# Patient Record
Sex: MALE | Race: WHITE | NOT HISPANIC OR LATINO | Employment: OTHER | URBAN - METROPOLITAN AREA
[De-identification: names, ages, dates, MRNs, and addresses within clinical notes are randomized per-mention and may not be internally consistent; named-entity substitution may affect disease eponyms.]

---

## 2023-04-24 ENCOUNTER — APPOINTMENT (OUTPATIENT)
Dept: PRIMARY CARE | Facility: CLINIC | Age: 68
End: 2023-04-24
Payer: MEDICARE

## 2023-06-05 ENCOUNTER — LAB (OUTPATIENT)
Dept: LAB | Facility: LAB | Age: 68
End: 2023-06-05
Payer: MEDICARE

## 2023-06-05 ENCOUNTER — OFFICE VISIT (OUTPATIENT)
Dept: PRIMARY CARE | Facility: CLINIC | Age: 68
End: 2023-06-05
Payer: MEDICARE

## 2023-06-05 VITALS
DIASTOLIC BLOOD PRESSURE: 69 MMHG | HEART RATE: 58 BPM | OXYGEN SATURATION: 95 % | TEMPERATURE: 99.2 F | WEIGHT: 272.1 LBS | BODY MASS INDEX: 40.3 KG/M2 | SYSTOLIC BLOOD PRESSURE: 131 MMHG | HEIGHT: 69 IN

## 2023-06-05 DIAGNOSIS — E66.01 CLASS 2 SEVERE OBESITY DUE TO EXCESS CALORIES WITH SERIOUS COMORBIDITY IN ADULT, UNSPECIFIED BMI (MULTI): ICD-10-CM

## 2023-06-05 DIAGNOSIS — I10 ESSENTIAL HYPERTENSION: ICD-10-CM

## 2023-06-05 DIAGNOSIS — F52.21 ERECTILE DYSFUNCTION OF NON-ORGANIC ORIGIN: Primary | ICD-10-CM

## 2023-06-05 PROBLEM — H53.9 VISUAL CHANGES: Status: ACTIVE | Noted: 2023-06-05

## 2023-06-05 PROBLEM — M47.816 LUMBAR SPONDYLOSIS: Status: ACTIVE | Noted: 2023-06-05

## 2023-06-05 PROBLEM — H93.11 SUBJECTIVE TINNITUS OF RIGHT EAR: Status: ACTIVE | Noted: 2023-06-05

## 2023-06-05 PROBLEM — M75.101 ROTATOR CUFF SYNDROME OF RIGHT SHOULDER: Status: ACTIVE | Noted: 2023-06-05

## 2023-06-05 PROBLEM — J30.9 ALLERGIC RHINITIS: Status: ACTIVE | Noted: 2023-06-05

## 2023-06-05 PROBLEM — H02.59 FLOPPY EYELID SYNDROME OF BOTH EYES: Status: ACTIVE | Noted: 2023-06-05

## 2023-06-05 PROBLEM — E78.00 ELEVATED LOW DENSITY LIPOPROTEIN (LDL) CHOLESTEROL LEVEL: Status: ACTIVE | Noted: 2023-06-05

## 2023-06-05 PROBLEM — H90.3 BILATERAL SENSORINEURAL HEARING LOSS: Status: ACTIVE | Noted: 2023-06-05

## 2023-06-05 PROBLEM — H90.A31 MIXED CONDUCTIVE AND SENSORINEURAL HEARING LOSS OF RIGHT EAR WITH RESTRICTED HEARING OF LEFT EAR: Status: ACTIVE | Noted: 2023-06-05

## 2023-06-05 PROBLEM — H69.91 EUSTACHIAN TUBE DYSFUNCTION, RIGHT: Status: ACTIVE | Noted: 2023-06-05

## 2023-06-05 PROBLEM — R97.20 ABNORMAL PROSTATE SPECIFIC ANTIGEN: Status: ACTIVE | Noted: 2023-06-05

## 2023-06-05 PROBLEM — H90.5 SENSORINEURAL HEARING LOSS (SNHL) OF LEFT EAR: Status: ACTIVE | Noted: 2023-06-05

## 2023-06-05 PROBLEM — K27.9 PEPTIC ULCER: Status: ACTIVE | Noted: 2021-05-27

## 2023-06-05 PROBLEM — M10.9 GOUT: Status: ACTIVE | Noted: 2023-06-05

## 2023-06-05 PROBLEM — M75.41 IMPINGEMENT SYNDROME OF RIGHT SHOULDER: Status: ACTIVE | Noted: 2023-06-05

## 2023-06-05 PROBLEM — H61.23 EXCESSIVE CERUMEN IN BOTH EAR CANALS: Status: ACTIVE | Noted: 2023-06-05

## 2023-06-05 PROBLEM — Z96.1 PSEUDOPHAKIA OF BOTH EYES: Status: ACTIVE | Noted: 2023-06-05

## 2023-06-05 PROBLEM — R20.2 LEFT HAND PARESTHESIA: Status: ACTIVE | Noted: 2023-06-05

## 2023-06-05 PROBLEM — M25.511 RIGHT SHOULDER PAIN: Status: ACTIVE | Noted: 2023-06-05

## 2023-06-05 PROBLEM — H26.491 POSTERIOR CAPSULAR OPACIFICATION VISUALLY SIGNIFICANT OF RIGHT EYE: Status: ACTIVE | Noted: 2023-06-05

## 2023-06-05 PROBLEM — K56.609 SMALL BOWEL OBSTRUCTION (MULTI): Status: ACTIVE | Noted: 2021-05-28

## 2023-06-05 PROBLEM — H16.223 KERATOCONJUNCTIVITIS SICCA OF BOTH EYES NOT DUE TO SJOGREN'S SYNDROME: Status: ACTIVE | Noted: 2023-06-05

## 2023-06-05 LAB
ALANINE AMINOTRANSFERASE (SGPT) (U/L) IN SER/PLAS: 17 U/L (ref 10–52)
ALBUMIN (G/DL) IN SER/PLAS: 4.4 G/DL (ref 3.4–5)
ALKALINE PHOSPHATASE (U/L) IN SER/PLAS: 53 U/L (ref 33–136)
ANION GAP IN SER/PLAS: 8 MMOL/L (ref 10–20)
ASPARTATE AMINOTRANSFERASE (SGOT) (U/L) IN SER/PLAS: 16 U/L (ref 9–39)
BILIRUBIN TOTAL (MG/DL) IN SER/PLAS: 0.7 MG/DL (ref 0–1.2)
CALCIUM (MG/DL) IN SER/PLAS: 10.2 MG/DL (ref 8.6–10.6)
CARBON DIOXIDE, TOTAL (MMOL/L) IN SER/PLAS: 32 MMOL/L (ref 21–32)
CHLORIDE (MMOL/L) IN SER/PLAS: 102 MMOL/L (ref 98–107)
CHOLESTEROL (MG/DL) IN SER/PLAS: 155 MG/DL (ref 0–199)
CHOLESTEROL IN HDL (MG/DL) IN SER/PLAS: 62.9 MG/DL
CHOLESTEROL/HDL RATIO: 2.5
CREATININE (MG/DL) IN SER/PLAS: 0.88 MG/DL (ref 0.5–1.3)
GFR MALE: >90 ML/MIN/1.73M2
GLUCOSE (MG/DL) IN SER/PLAS: 99 MG/DL (ref 74–99)
LDL: 80 MG/DL (ref 0–99)
POTASSIUM (MMOL/L) IN SER/PLAS: 4.3 MMOL/L (ref 3.5–5.3)
PROTEIN TOTAL: 7.4 G/DL (ref 6.4–8.2)
SODIUM (MMOL/L) IN SER/PLAS: 138 MMOL/L (ref 136–145)
TRIGLYCERIDE (MG/DL) IN SER/PLAS: 60 MG/DL (ref 0–149)
UREA NITROGEN (MG/DL) IN SER/PLAS: 16 MG/DL (ref 6–23)
VLDL: 12 MG/DL (ref 0–40)

## 2023-06-05 PROCEDURE — 80061 LIPID PANEL: CPT

## 2023-06-05 PROCEDURE — 99203 OFFICE O/P NEW LOW 30 MIN: CPT | Performed by: FAMILY MEDICINE

## 2023-06-05 PROCEDURE — 80053 COMPREHEN METABOLIC PANEL: CPT

## 2023-06-05 PROCEDURE — 3075F SYST BP GE 130 - 139MM HG: CPT | Performed by: FAMILY MEDICINE

## 2023-06-05 PROCEDURE — 3078F DIAST BP <80 MM HG: CPT | Performed by: FAMILY MEDICINE

## 2023-06-05 PROCEDURE — 1036F TOBACCO NON-USER: CPT | Performed by: FAMILY MEDICINE

## 2023-06-05 PROCEDURE — 1159F MED LIST DOCD IN RCRD: CPT | Performed by: FAMILY MEDICINE

## 2023-06-05 PROCEDURE — 36415 COLL VENOUS BLD VENIPUNCTURE: CPT

## 2023-06-05 RX ORDER — TADALAFIL 5 MG/1
TABLET ORAL
COMMUNITY
Start: 2018-07-09 | End: 2023-06-05 | Stop reason: SDUPTHER

## 2023-06-05 RX ORDER — AMLODIPINE BESYLATE 10 MG/1
TABLET ORAL
COMMUNITY
Start: 2014-01-28 | End: 2023-10-12 | Stop reason: SDUPTHER

## 2023-06-05 RX ORDER — KETOCONAZOLE 20 MG/G
CREAM TOPICAL
COMMUNITY
Start: 2023-04-17

## 2023-06-05 RX ORDER — METOPROLOL SUCCINATE 100 MG/1
TABLET, EXTENDED RELEASE ORAL
COMMUNITY
Start: 2014-01-28 | End: 2023-10-12 | Stop reason: SDUPTHER

## 2023-06-05 RX ORDER — SENNOSIDES 8.6 MG/1
1 TABLET ORAL DAILY PRN
COMMUNITY
Start: 2021-05-30

## 2023-06-05 RX ORDER — ATORVASTATIN CALCIUM 40 MG/1
TABLET, FILM COATED ORAL
COMMUNITY
Start: 2014-01-28 | End: 2023-10-12 | Stop reason: SDUPTHER

## 2023-06-05 RX ORDER — ALLOPURINOL 300 MG/1
TABLET ORAL
COMMUNITY
Start: 2014-01-28 | End: 2023-10-12 | Stop reason: SDUPTHER

## 2023-06-05 RX ORDER — TADALAFIL 5 MG/1
5 TABLET ORAL DAILY PRN
Qty: 10 TABLET | Refills: 2 | Status: SHIPPED | OUTPATIENT
Start: 2023-06-05

## 2023-06-05 RX ORDER — MINOXIDIL 10 MG/1
TABLET ORAL
COMMUNITY
Start: 2014-01-28 | End: 2023-10-12 | Stop reason: SDUPTHER

## 2023-06-05 RX ORDER — TRIAMTERENE AND HYDROCHLOROTHIAZIDE 75; 50 MG/1; MG/1
TABLET ORAL
COMMUNITY
Start: 2014-01-28 | End: 2023-10-12 | Stop reason: SDUPTHER

## 2023-06-05 NOTE — PROGRESS NOTES
"  Subjective   Chief complaint: Jose Lawrence is a 67 y.o. male who presents for Establish Care.    HPI:  67 yrs old here to establish care he does have HTN for which he is on multiple medications, he states that his BP is usually higher.He is currently asymptomatic. He has gained a lot and have decreased exercise tolerance that have worsened by the epidemic.   Used to be a  and currently retired, lives alone, no kids.  He is not currently sexual active but has been using cialis for ED , have been using it for 5 years.      Ros:  Patient denies  Shortness of breath , chest pain  Nausea vomiting and diarrhea  No fever or chills  No dysuria  No hearing or vision changes  No skin lesions.    Objective   /69   Pulse 58   Temp 37.3 °C (99.2 °F) (Temporal)   Ht 1.753 m (5' 9\")   Wt 123 kg (272 lb 1.6 oz)   SpO2 95%   BMI 40.18 kg/m²       General appearance: alert and oriented, in no acute distress  Eyes: conjunctivae/corneas clear. PERRL, EOM's intact. Fundi benign.  Neck: no adenopathy, no carotid bruit, supple, symmetrical, trachea midline, and thyroid not enlarged, symmetric, no tenderness/mass/nodules  Lungs: clear to auscultation bilaterally  Chest wall: no tenderness  Heart: regular rate and rhythm, S1, S2 normal, no murmur, click, rub or gallop  Abdomen: soft, non-tender; bowel sounds normal; no masses, no organomegaly  Skin: Skin color, texture, turgor normal. No rashes or lesions  Neurologic: Motor and sensory Grossly normal        I have reviewed and reconciled the medication list with the patient today.   Current Outpatient Medications:     allopurinol (Zyloprim) 300 mg tablet, allopurinol 300 mg tablet, Disp: , Rfl:     amLODIPine (Norvasc) 10 mg tablet, amlodipine 10 mg tablet, Disp: , Rfl:     atorvastatin (Lipitor) 40 mg tablet, atorvastatin 40 mg tablet, Disp: , Rfl:     ketoconazole (NIZOral) 2 % cream, APPLY 1 APPLICATION TWICE A DAY TO AFFECTED AREA OF FACE, Disp: , Rfl:     " metoprolol succinate XL (Toprol-XL) 100 mg 24 hr tablet, metoprolol succinate  mg tablet,extended release 24 hr, Disp: , Rfl:     minoxidil (Loniten) 10 mg tablet, minoxidil 10 mg tablet, Disp: , Rfl:     sennosides (Senokot) 8.6 mg tablet, Take 1 tablet (8.6 mg) by mouth once daily as needed., Disp: , Rfl:     tadalafil (Cialis) 5 mg tablet, Take by mouth., Disp: , Rfl:     triamterene-hydrochlorothiazid (Maxzide) 75-50 mg tablet, triamterene 75 mg-hydrochlorothiazide 50 mg tablet, Disp: , Rfl:      Imaging:  No results found.     Labs reviewed:    Lab Results   Component Value Date    WBC 5.6 07/26/2022    HGB 15.1 07/26/2022    HCT 44.3 07/26/2022     07/26/2022    CHOL 150 08/11/2022    TRIG 103 08/11/2022    HDL 57.8 08/11/2022    ALT 20 08/11/2022    AST 15 08/11/2022     08/11/2022    K 4.0 08/11/2022     08/11/2022    CREATININE 0.70 08/11/2022    BUN 17 08/11/2022    CO2 31 08/11/2022    PSA 1.18 08/11/2022         Assessment/Plan   Diagnoses and all orders for this visit:  67 yrs old here to establish care and for meds refills,  Erectile dysfunction of non-organic origin  -     tadalafil (Cialis) 5 mg tablet; Take 1 tablet (5 mg) by mouth once daily as needed for erectile dysfunction.  Essential hypertension  -     Comprehensive Metabolic Panel; Future  -     Lipid Panel; Future  Class 2 severe obesity due to excess calories with serious comorbidity in adult, unspecified BMI (CMS/HCC)  -     Lipid Panel; Future    Diagnosis and Management discussed with the patient.  Patient agreeable with plan.  Patient advised to Return to clinic with new or unresolved symptoms.  Ekaterina Said MD    This note was partially generated using the Dragon Voice Recognition System and there may be some incorrect words or wording, spelling and /or spelling errors, or punctuation errors that were not corrected prior to committing the note to the medical record.

## 2023-09-20 ENCOUNTER — APPOINTMENT (OUTPATIENT)
Dept: PRIMARY CARE | Facility: CLINIC | Age: 68
End: 2023-09-20
Payer: MEDICARE

## 2023-10-04 ENCOUNTER — APPOINTMENT (OUTPATIENT)
Dept: PRIMARY CARE | Facility: CLINIC | Age: 68
End: 2023-10-04
Payer: MEDICARE

## 2023-10-11 ENCOUNTER — APPOINTMENT (OUTPATIENT)
Dept: PRIMARY CARE | Facility: CLINIC | Age: 68
End: 2023-10-11
Payer: MEDICARE

## 2023-10-12 DIAGNOSIS — I10 ESSENTIAL HYPERTENSION: ICD-10-CM

## 2023-10-12 DIAGNOSIS — M10.00 IDIOPATHIC GOUT, UNSPECIFIED CHRONICITY, UNSPECIFIED SITE: Primary | ICD-10-CM

## 2023-10-12 NOTE — TELEPHONE ENCOUNTER
Pt called requesting refills of atorvastatin, allopurinol, amlodipine, minoxidil, metoprolol and triamterene hydrochlorothiazide. Orders pended and routed to provider.   
No

## 2023-10-13 ENCOUNTER — DOCUMENTATION (OUTPATIENT)
Dept: PRIMARY CARE | Facility: CLINIC | Age: 68
End: 2023-10-13
Payer: MEDICARE

## 2023-10-13 RX ORDER — AMLODIPINE BESYLATE 10 MG/1
10 TABLET ORAL DAILY
Qty: 30 TABLET | Refills: 1 | Status: SHIPPED | OUTPATIENT
Start: 2023-10-13 | End: 2023-12-04

## 2023-10-13 RX ORDER — METOPROLOL SUCCINATE 100 MG/1
100 TABLET, EXTENDED RELEASE ORAL DAILY
Qty: 30 TABLET | Refills: 2 | Status: SHIPPED | OUTPATIENT
Start: 2023-10-13

## 2023-10-13 RX ORDER — ATORVASTATIN CALCIUM 40 MG/1
40 TABLET, FILM COATED ORAL DAILY
Qty: 30 TABLET | Refills: 2 | Status: SHIPPED | OUTPATIENT
Start: 2023-10-13

## 2023-10-13 RX ORDER — ALLOPURINOL 300 MG/1
300 TABLET ORAL DAILY
Qty: 30 TABLET | Refills: 2 | Status: SHIPPED | OUTPATIENT
Start: 2023-10-13

## 2023-10-13 RX ORDER — MINOXIDIL 10 MG/1
10 TABLET ORAL DAILY
Qty: 30 TABLET | Refills: 1 | Status: SHIPPED | OUTPATIENT
Start: 2023-10-13 | End: 2023-12-04

## 2023-10-13 RX ORDER — TRIAMTERENE AND HYDROCHLOROTHIAZIDE 75; 50 MG/1; MG/1
1 TABLET ORAL DAILY
Qty: 30 TABLET | Refills: 2 | Status: SHIPPED | OUTPATIENT
Start: 2023-10-13

## 2023-10-13 NOTE — PROGRESS NOTES
Call placed to pt r/t refills and med changes. Provider changed dosing for minoxidil and amlodipine to once daily with suggestion to schedule follow up visit. Call placed. Pt updated to changes, but refused scheduling at this time d/t needing his calendar to schedule. Provider made away.

## 2023-12-02 DIAGNOSIS — I10 ESSENTIAL HYPERTENSION: ICD-10-CM

## 2023-12-04 RX ORDER — AMLODIPINE BESYLATE 10 MG/1
10 TABLET ORAL DAILY
Qty: 90 TABLET | Refills: 1 | Status: SHIPPED | OUTPATIENT
Start: 2023-12-04

## 2023-12-04 RX ORDER — MINOXIDIL 10 MG/1
10 TABLET ORAL DAILY
Qty: 90 TABLET | Refills: 1 | Status: SHIPPED | OUTPATIENT
Start: 2023-12-04

## 2024-01-25 NOTE — PROGRESS NOTES
Subjective     Jose Lawrence is a 68 y.o. male who presents for the following: Rash.  He notes a red, scaly, itchy rash on his scrotum for the past few months.  He states he has used triamcinolone cream in the past, which has helped, but he ran out and the rash has been flaring.  He also notes 2 scaly, itchy bumps on his left fifth finger.  They have not changed in any way, including in size, shape, or color, and they do not hurt or bleed.    Review of Systems:  No other skin or systemic complaints other than what is documented elsewhere in the note.    The following portions of the chart were reviewed this encounter and updated as appropriate:       Skin Cancer History  No skin cancer on file.    Specialty Problems          Dermatology Problems    Ingrowing nail       Past Dermatologic / Past Relevant Medical History:    - history of AKs s/p course of topical field therapy with Efudex 5% cream completed on his face in February 2018 and reported prior history of treatment with liquid nitrogen cryotherapy as well as Efudex 5% cream by his previous outside Dermatologist at the [facility], Dr. 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 [place]    Allergies:  Animal dander    Current Medications / CAM's:    Current Outpatient Medications:     allopurinol (Zyloprim) 300 mg tablet, Take 1 tablet (300 mg) by mouth once daily., Disp: 30 tablet, Rfl: 2    amLODIPine (Norvasc) 10 mg tablet, TAKE 1 TABLET BY MOUTH EVERY DAY, Disp: 90 tablet, Rfl: 1    atorvastatin (Lipitor) 40 mg tablet, Take 1 tablet (40 mg) by mouth once daily., Disp: 30 tablet, Rfl: 2    ketoconazole (NIZOral) 2 % cream, APPLY 1 APPLICATION TWICE A DAY TO AFFECTED AREA OF FACE, Disp: , Rfl:     lisinopril 5 mg tablet, Take 1 tablet  (5 mg) by mouth once daily., Disp: , Rfl:     metoprolol succinate XL (Toprol-XL) 100 mg 24 hr tablet, Take 1 tablet (100 mg) by mouth once daily. Do not crush or chew., Disp: 30 tablet, Rfl: 2    minoxidil (Loniten) 10 mg tablet, TAKE 1 TABLET BY MOUTH EVERY DAY, Disp: 90 tablet, Rfl: 1    sennosides (Senokot) 8.6 mg tablet, Take 1 tablet (8.6 mg) by mouth once daily as needed., Disp: , Rfl:     tadalafil (Cialis) 5 mg tablet, Take 1 tablet (5 mg) by mouth once daily as needed for erectile dysfunction., Disp: 10 tablet, Rfl: 2    triamcinolone (Kenalog) 0.1 % cream, Apply twice daily to affected areas of body (avoid face, groin, body folds) for 2 weeks, then taper to twice daily on weekends only; repeat every 6-8 weeks as needed for flares, Disp: 80 g, Rfl: 1    triamterene-hydrochlorothiazid (Maxzide) 75-50 mg tablet, Take 1 tablet by mouth once daily., Disp: 30 tablet, Rfl: 2     Objective   Well appearing patient in no apparent distress; mood and affect are within normal limits.    A full examination was performed including scalp, face, eyes, ears, nose, lips, neck, chest, axillae, abdomen, back, bilateral upper extremities, and bilateral lower extremities. All findings within normal limits unless otherwise noted below.    Assessment/Plan   1. Lichen simplex chronicus  Anterior Scrotum  On his scrotum, there is a large confluent pink and erythematous, scaly, lichenified, thickened plaque with excoriations    Lichen simplex chronicus - scrotum.  The potentially chronic and intermittently flaring nature of this condition likely secondary to chronic chronic rubbing, scratching, excoriation, and/or friction and treatment options were discussed extensively with the patient today.  At this time, I recommend topical steroid therapy with Triamcinolone 0.1% cream, which the patient was instructed to apply twice daily to the affected areas of his scrotum (avoid face, groin, body folds) for the next 2-3 weeks, followed by  taper to twice daily on weekends only for persistent areas; the patient may repeat treatment in a 2-week burst-and-taper fashion every 6-8 weeks as needed for future flares.  I also emphasized the importance of dry, sensitive skin care, including the use of a mild soap, such as Dove, and frequent and aggressive moisturization, at least twice daily and immediately following showers or baths, with recommended over-the-counter moisturizing creams, such as Eucerin, Cetaphil, Cerave, or Aveeno, or Vaseline or Aquaphor ointments.  The risks, benefits, and side effects of this medication, including possible skin atrophy with overuse of topical steroids, were discussed.  I also emphasized the importance of avoiding any further rubbing, scratching, and/or excoriation of the skin, as these behaviors are exacerbating this condition.  The patient expressed understanding and is in agreement with this plan.    triamcinolone (Kenalog) 0.1 % cream - Anterior Scrotum  Apply twice daily to affected areas of body (avoid face, groin, body folds) for 2 weeks, then taper to twice daily on weekends only; repeat every 6-8 weeks as needed for flares    2. Other viral warts (2)  Left Hand - Anterior (2)  On dorsal surface of his left fifth finger, there are 2 similar-appearing 7 mm flesh-colored, hyperkeratotic, verrucous papules with visible thrombosed capillaries    Verruca Vulgaris - left 5th finger.  The viral nature of these warts and treatment options were discussed extensively with the patient today.  At this time, I recommend treatment with liquid nitrogen cryotherapy in the office today.  Should any of the warts not resolve within 2-3 weeks following treatment today, the patient was instructed to call our office to schedule a return visit for re-evaluation and possible repeat treatment if indicated at that time.  The patient expressed understanding, is in agreement with this plan, and wishes to proceed with cryotherapy  today.    Destr of lesion - Left Hand - Anterior  Complexity: simple    Destruction method: cryotherapy    Informed consent: discussed and consent obtained    Lesion destroyed using liquid nitrogen: Yes    Cryotherapy cycles:  2  Outcome: patient tolerated procedure well with no complications    Post-procedure details: wound care instructions given      3. Actinic keratosis (16)  Head - Anterior (Face) (16)  Scattered on the patient's face, mainly his forehead, there are multiple erythematous, gritty, scaly macules     Actinic Keratoses - scattered on face.  The pre-cancerous nature of these lesions and treatment options were discussed with the patient today.  At this time, I recommend treatment with liquid nitrogen cryotherapy.  The patient expressed understanding, is in agreement with this plan, and wishes to proceed with cryotherapy today.    Destr of lesion - Head - Anterior (Face)  Complexity: simple    Destruction method: cryotherapy    Informed consent: discussed and consent obtained    Lesion destroyed using liquid nitrogen: Yes    Cryotherapy cycles:  1  Outcome: patient tolerated procedure well with no complications    Post-procedure details: wound care instructions given      4. Melanocytic nevus of trunk  Scattered on the patient's face, neck, trunk, and extremities, there are several small, round- to oval-shaped, brown-pigmented and pink-colored, symmetric, uniform-appearing macules and dome-shaped papules    Clinically benign- to slightly atypical-appearing nevi - the clinically benign- to slightly atypical-appearing nature of the patient's nevi was discussed with the patient today.  None of the patient's nevi meet threshold for biopsy today.  I emphasized the importance of performing monthly self-skin exams using the ABCDs of monitoring moles, which were reviewed with the patient today and an informational hand-out provided.  I also emphasized the importance of sun avoidance and sun protection with  daily sunscreen use.  The patient expressed understanding and is in agreement with this plan.    5. Seborrheic keratosis  Scattered on the patient's face, neck, trunk, and extremities, there are multiple tan- to light brown-colored, hyperkeratotic, stuck-on appearing papules of varying size and shape    Seborrheic Keratoses - the benign nature of these lesions was discussed with the patient today and reassurance provided.  No treatment is medically indicated for these lesions at this time.    6. History of actinic keratoses  There is evidence of photodamage in sun-exposed areas.    History of actinic keratoses and photodamage.  The signs and symptoms of skin cancer were reviewed and the patient was advised to practice sun protection and sun avoidance, use daily sunscreen, and perform regular self skin exams.  I will have the patient return to our office in 6 to 12 months for routine follow-up and skin exam, and the patient was instructed to call our office should the patient notice any new, changing, symptomatic, or otherwise concerning skin lesions before then.  The patient expressed understanding and is in agreement with this plan.    7. Diffuse photodamage of skin  Photodistributed  Diffuse photodamage with actinic changes with telangiectasia and mottled pigmentation in sun-exposed areas.    Photodamage.  The signs and symptoms of skin cancer were reviewed and the patient was advised to practice sun protection and sun avoidance, use daily sunscreen, and perform regular self skin exams.  Sun protection was discussed, including avoiding the mid-day sun, wearing a sunscreen with SPF at least 50, and stressing the need for reapplication of sunscreen and applying more than they think they need.

## 2024-01-26 ENCOUNTER — OFFICE VISIT (OUTPATIENT)
Dept: DERMATOLOGY | Facility: CLINIC | Age: 69
End: 2024-01-26
Payer: MEDICARE

## 2024-01-26 DIAGNOSIS — B07.8 OTHER VIRAL WARTS: ICD-10-CM

## 2024-01-26 DIAGNOSIS — L57.8 DIFFUSE PHOTODAMAGE OF SKIN: ICD-10-CM

## 2024-01-26 DIAGNOSIS — L28.0 LICHEN SIMPLEX CHRONICUS: Primary | ICD-10-CM

## 2024-01-26 DIAGNOSIS — D22.5 MELANOCYTIC NEVUS OF TRUNK: ICD-10-CM

## 2024-01-26 DIAGNOSIS — L82.1 SEBORRHEIC KERATOSIS: ICD-10-CM

## 2024-01-26 DIAGNOSIS — L57.0 ACTINIC KERATOSIS: ICD-10-CM

## 2024-01-26 DIAGNOSIS — Z87.2 HISTORY OF ACTINIC KERATOSES: ICD-10-CM

## 2024-01-26 PROCEDURE — 1159F MED LIST DOCD IN RCRD: CPT | Performed by: DERMATOLOGY

## 2024-01-26 PROCEDURE — 1036F TOBACCO NON-USER: CPT | Performed by: DERMATOLOGY

## 2024-01-26 PROCEDURE — 99214 OFFICE O/P EST MOD 30 MIN: CPT | Performed by: DERMATOLOGY

## 2024-01-26 PROCEDURE — 17110 DESTRUCTION B9 LES UP TO 14: CPT | Performed by: DERMATOLOGY

## 2024-01-26 PROCEDURE — 17004 DESTROY PREMAL LESIONS 15/>: CPT | Performed by: DERMATOLOGY

## 2024-01-26 RX ORDER — TRIAMCINOLONE ACETONIDE 1 MG/G
CREAM TOPICAL
Qty: 80 G | Refills: 1 | Status: SHIPPED | OUTPATIENT
Start: 2024-01-26

## 2024-01-26 RX ORDER — LISINOPRIL 5 MG/1
5 TABLET ORAL DAILY
COMMUNITY

## 2024-01-26 ASSESSMENT — DERMATOLOGY QUALITY OF LIFE (QOL) ASSESSMENT
RATE HOW BOTHERED YOU ARE BY EFFECTS OF YOUR SKIN PROBLEMS ON YOUR ACTIVITIES (EG, GOING OUT, ACCOMPLISHING WHAT YOU WANT, WORK ACTIVITIES OR YOUR RELATIONSHIPS WITH OTHERS): 0 - NEVER BOTHERED
WHAT SINGLE SKIN CONDITION LISTED BELOW IS THE PATIENT ANSWERING THE QUALITY-OF-LIFE ASSESSMENT QUESTIONS ABOUT: DERMATITIS
DATE THE QUALITY-OF-LIFE ASSESSMENT WAS COMPLETED: 66865
RATE HOW BOTHERED YOU ARE BY SYMPTOMS OF YOUR SKIN PROBLEM (EG, ITCHING, STINGING BURNING, HURTING OR SKIN IRRITATION): 6 - ALWAYS BOTHERED
RATE HOW BOTHERED YOU ARE BY SYMPTOMS OF YOUR SKIN PROBLEM (EG, ITCHING, STINGING BURNING, HURTING OR SKIN IRRITATION): 6 - ALWAYS BOTHERED
WHAT SINGLE SKIN CONDITION LISTED BELOW IS THE PATIENT ANSWERING THE QUALITY-OF-LIFE ASSESSMENT QUESTIONS ABOUT: DERMATITIS
RATE HOW EMOTIONALLY BOTHERED YOU ARE BY YOUR SKIN PROBLEM (FOR EXAMPLE, WORRY, EMBARRASSMENT, FRUSTRATION): 2
RATE HOW BOTHERED YOU ARE BY EFFECTS OF YOUR SKIN PROBLEMS ON YOUR ACTIVITIES (EG, GOING OUT, ACCOMPLISHING WHAT YOU WANT, WORK ACTIVITIES OR YOUR RELATIONSHIPS WITH OTHERS): 0 - NEVER BOTHERED
RATE HOW EMOTIONALLY BOTHERED YOU ARE BY YOUR SKIN PROBLEM (FOR EXAMPLE, WORRY, EMBARRASSMENT, FRUSTRATION): 2

## 2024-01-26 ASSESSMENT — PATIENT GLOBAL ASSESSMENT (PGA): WHAT IS THE PGA: PATIENT GLOBAL ASSESSMENT:  4 - SEVERE

## 2024-07-22 ENCOUNTER — APPOINTMENT (OUTPATIENT)
Dept: ENDOCRINOLOGY | Facility: CLINIC | Age: 69
End: 2024-07-22
Payer: MEDICARE

## 2024-07-22 VITALS
BODY MASS INDEX: 43.84 KG/M2 | WEIGHT: 296 LBS | HEIGHT: 69 IN | SYSTOLIC BLOOD PRESSURE: 129 MMHG | HEART RATE: 74 BPM | DIASTOLIC BLOOD PRESSURE: 65 MMHG

## 2024-07-22 DIAGNOSIS — E66.01 CLASS 3 SEVERE OBESITY WITH BODY MASS INDEX (BMI) OF 40.0 TO 44.9 IN ADULT, UNSPECIFIED OBESITY TYPE, UNSPECIFIED WHETHER SERIOUS COMORBIDITY PRESENT (MULTI): Primary | ICD-10-CM

## 2024-07-22 DIAGNOSIS — Z86.39 HISTORY OF HIGH CHOLESTEROL: ICD-10-CM

## 2024-07-22 DIAGNOSIS — R73.03 PRE-DIABETES: ICD-10-CM

## 2024-07-22 DIAGNOSIS — I10 ESSENTIAL HYPERTENSION: ICD-10-CM

## 2024-07-22 LAB — POC HEMOGLOBIN A1C: 5.6 % (ref 4.2–6.5)

## 2024-07-22 PROCEDURE — 3008F BODY MASS INDEX DOCD: CPT | Performed by: NURSE PRACTITIONER

## 2024-07-22 PROCEDURE — 1036F TOBACCO NON-USER: CPT | Performed by: NURSE PRACTITIONER

## 2024-07-22 PROCEDURE — 1159F MED LIST DOCD IN RCRD: CPT | Performed by: NURSE PRACTITIONER

## 2024-07-22 PROCEDURE — 3078F DIAST BP <80 MM HG: CPT | Performed by: NURSE PRACTITIONER

## 2024-07-22 PROCEDURE — 1160F RVW MEDS BY RX/DR IN RCRD: CPT | Performed by: NURSE PRACTITIONER

## 2024-07-22 PROCEDURE — 99204 OFFICE O/P NEW MOD 45 MIN: CPT | Performed by: NURSE PRACTITIONER

## 2024-07-22 PROCEDURE — 3074F SYST BP LT 130 MM HG: CPT | Performed by: NURSE PRACTITIONER

## 2024-07-22 PROCEDURE — 83036 HEMOGLOBIN GLYCOSYLATED A1C: CPT | Performed by: NURSE PRACTITIONER

## 2024-07-22 RX ORDER — SEMAGLUTIDE 0.25 MG/.5ML
0.25 INJECTION, SOLUTION SUBCUTANEOUS
Qty: 2 ML | Refills: 1 | Status: SHIPPED | OUTPATIENT
Start: 2024-07-22

## 2024-07-22 ASSESSMENT — ENCOUNTER SYMPTOMS
NUMBNESS: 0
EYE PAIN: 0
POLYDIPSIA: 0
PHOTOPHOBIA: 0
POLYPHAGIA: 0
FATIGUE: 0
DIZZINESS: 0
EYE DISCHARGE: 0
COUGH: 0
ABDOMINAL DISTENTION: 0
SPEECH DIFFICULTY: 0
FREQUENCY: 0
RHINORRHEA: 0
CONSTIPATION: 0
FLANK PAIN: 0
BACK PAIN: 0
DIARRHEA: 0
HEADACHES: 0
ACTIVITY CHANGE: 0

## 2024-07-22 NOTE — Clinical Note
Hi, This patient desire to use [email or web address], I have the written script to be faxed that I can give you, please let me know tomorrow and I will give it to you. The patient will go on the website to complete the information and was advised to let me know when it is completed. Thank you.

## 2024-07-22 NOTE — PROGRESS NOTES
"Jose Lawrence presents for weight loss management and obesity consult today. He presents for weight management today per self referral. He was advised that he has restrictive breathing related to large abdomen.    Obesity History:  Childhood and teen  \"I was always heavy, however in highschool I was about 180\"  Age of Onset: late adult years, he has gained 30 pounds over the last 4 years  Lowest adult weight: 180  Highest adult weight: 296  Current weight: 296     Family history of obesity:  Mother    Biggest challenge with weight management:   Unsure, sedentary lifestyle    History of Weight Loss Efforts: no  Successful weight loss techniques attempted:  dieting  Unsuccessful weight loss techniques attempted:  dieting    Current typical daily diet:  Typical Breakfast: \"my least healthy meals\" omelet, toast, potatoes  Typical Lunch: salad  Typical Dinner: chinese meals with vegetables or Syrian with salad  Soda/latte weekly intake:none  Take out/carry out/fast food weekly: 7 days    Snacking  Daytime snacks: no  Evening snacks: ,no      Describe Appetite: average  How satiated are you after a meal? yes  Emotional eater? No   Boredom eater? No     Current Exercise Habits  none for some time, he began walking on treadmill last week    Sleep patterns: improved greatly since using CPAP machine over the last 3 weeks  H/O Sleep apnea: yes  Well rested? Yes     Stress (describe daily stress): none      Any intake of an appetite suppressant or an anti-obesity medicine? No     H/O Pancreatitis: no  H/O Thyroid Medullary Cancer: no    Past Medical History:   Diagnosis Date    Myalgia, other site 03/18/2019    Myofascial pain syndrome    Other allergy status, other than to drugs and biological substances     History of environmental allergies    Personal history of other diseases of the circulatory system     History of hypertension    Personal history of other diseases of the musculoskeletal system and connective tissue     " History of gout    Personal history of other diseases of the musculoskeletal system and connective tissue 03/18/2019    History of low back pain    Personal history of other diseases of the nervous system and sense organs     History of cataract    Personal history of other diseases of the respiratory system     History of asthma    Personal history of other endocrine, nutritional and metabolic disease     History of high cholesterol    Personal history of other specified conditions     History of snoring    Personal history of other specified conditions     History of ulceration       Current Outpatient Medications   Medication Sig Dispense Refill    allopurinol (Zyloprim) 300 mg tablet Take 1 tablet (300 mg) by mouth once daily. 30 tablet 2    amLODIPine (Norvasc) 10 mg tablet TAKE 1 TABLET BY MOUTH EVERY DAY 90 tablet 1    atorvastatin (Lipitor) 40 mg tablet Take 1 tablet (40 mg) by mouth once daily. 30 tablet 2    ketoconazole (NIZOral) 2 % cream APPLY 1 APPLICATION TWICE A DAY TO AFFECTED AREA OF FACE      lisinopril 5 mg tablet Take 1 tablet (5 mg) by mouth once daily.      metoprolol succinate XL (Toprol-XL) 100 mg 24 hr tablet Take 1 tablet (100 mg) by mouth once daily. Do not crush or chew. 30 tablet 2    minoxidil (Loniten) 10 mg tablet TAKE 1 TABLET BY MOUTH EVERY DAY 90 tablet 1    tadalafil (Cialis) 5 mg tablet Take 1 tablet (5 mg) by mouth once daily as needed for erectile dysfunction. 10 tablet 2    triamterene-hydrochlorothiazid (Maxzide) 75-50 mg tablet Take 1 tablet by mouth once daily. 30 tablet 2    sennosides (Senokot) 8.6 mg tablet Take 1 tablet (8.6 mg) by mouth once daily as needed.      triamcinolone (Kenalog) 0.1 % cream Apply twice daily to affected areas of body (avoid face, groin, body folds) for 2 weeks, then taper to twice daily on weekends only; repeat every 6-8 weeks as needed for flares (Patient not taking: Reported on 7/22/2024) 80 g 1     No current facility-administered  "medications for this visit.           Review of Systems  Review of Systems   Constitutional:  Negative for activity change and fatigue.   HENT:  Negative for congestion, dental problem, ear pain, mouth sores and rhinorrhea.    Eyes:  Negative for photophobia, pain and discharge.   Respiratory:  Negative for cough.    Cardiovascular:  Negative for leg swelling.   Gastrointestinal:  Negative for abdominal distention, constipation and diarrhea.   Endocrine: Negative for polydipsia, polyphagia and polyuria.   Genitourinary:  Negative for flank pain, frequency and urgency.   Musculoskeletal:  Negative for back pain.   Skin:  Negative for pallor and rash.   Neurological:  Negative for dizziness, speech difficulty, numbness and headaches.   Psychiatric/Behavioral:  Negative for behavioral problems.            Objective   /65 (BP Location: Right arm, Patient Position: Sitting, BP Cuff Size: Adult)   Pulse 74   Ht 1.753 m (5' 9\")   Wt 134 kg (296 lb)   BMI 43.71 kg/m²     Physical Exam  Physical Exam  Vitals reviewed.   Constitutional:       General: He is not in acute distress.     Appearance: Normal appearance. He is obese.   Neck:      Comments: Thyroid exam is normal, no palpable nodules  No goiter is noted  Cardiovascular:      Rate and Rhythm: Normal rate and regular rhythm.   Pulmonary:      Effort: Pulmonary effort is normal.      Breath sounds: Normal breath sounds.   Musculoskeletal:         General: Swelling (lower extremities) present.   Skin:     General: Skin is warm and dry.   Neurological:      Mental Status: He is alert and oriented to person, place, and time.   Psychiatric:         Mood and Affect: Mood normal.         Behavior: Behavior normal.         Thought Content: Thought content normal.         Judgment: Judgment normal.         Lab Review  No results found for: \"HGBA1C\"  No results found for: \"LDLCALC\"    Weight Trends  Trends of weight reviewed in visit, see graph " below:    Assessment/Plan     Follow up and Goals:  Physical fitness: Walk of 15 minutes after dinner, 2 time a free weight workout with [employer] online on youtube using the modification  Nutrition: Consult with Helen with focus on portion sizes, discussing fresh dinner meal options and using Mediterranean diet approach  Please go to [employer] and complete their registeration  Reviewed how the GLP1 class of medications  work, common side effects, administration of GLP1, titration of the medication, patient denies h/o pancreatitis and thyroid cancers    Advised to have PCP consult regard lower extremity swelling, patient notes he plans to call this afternoon  ....      Problem List Items Addressed This Visit    None      Diet interventions:  mediterranean  Discussed Mediterranean Diet, given pamphlet or it will be mailed, we looked at ADA plate, portion sizes, recipes. Advised to review in preparation for nutrition consult    Exercise intervention:   We discussed the importance of incorporating resistance and free weight  lifting into physical activity routine to prevent muscle wasting with weight loss, enhance bone health, the positive role increased muscle has in burning fat at rest. We looked at examples of these types of exercise routines online. Advised to do modifications. Advised to check with PCP if there is a h/o musculoskeletal injury or hx. We discussed benefits of walking with weight loss and as a cardio form of exercise. Gradually increase exercise to a goal of 150 minutes at least per week.          Follow Up:  Referred to nutrition or continue to work with current dietitian   Discussed obesity medications, side effects and mechanism of action reviewed with patient  Discussed physical activity: we reviewed [employer] videos for strength training, advised to use modifications for these videos, we discussed benefits of strength training and resistance bands  on bone and muscle health, we discussed  walking and water aerobic options for cardio  Discussed Mediterranean Diet, given pamphlet or it will be mailed, we looked at ADA plate, portion sizes, recipes. Advised to review Mediterranean Meal Plan booklet  in preparation for nutrition consult  ....  1 month group visit and nutrition visit in person or  virtual  Please reach out if you need anything or have further questions

## 2024-07-23 ENCOUNTER — TELEPHONE (OUTPATIENT)
Dept: ENDOCRINOLOGY | Facility: CLINIC | Age: 69
End: 2024-07-23
Payer: MEDICARE

## 2024-07-23 NOTE — TELEPHONE ENCOUNTER
----- Message from Renae Stanley sent at 7/22/2024  2:16 PM EDT -----  Hi,  This patient desire to use [email or web address], I have the written script to be faxed that I can give you, please let me know tomorrow and I will give it to you. The patient will go on the website to complete the information and was advised to let me know when it is completed. Thank you.

## 2024-08-13 ENCOUNTER — APPOINTMENT (OUTPATIENT)
Dept: ENDOCRINOLOGY | Facility: CLINIC | Age: 69
End: 2024-08-13
Payer: MEDICARE

## 2024-08-13 VITALS — BODY MASS INDEX: 43.84 KG/M2 | HEIGHT: 69 IN | WEIGHT: 296 LBS

## 2024-08-13 DIAGNOSIS — E66.01 CLASS 3 SEVERE OBESITY WITH BODY MASS INDEX (BMI) OF 40.0 TO 44.9 IN ADULT, UNSPECIFIED OBESITY TYPE, UNSPECIFIED WHETHER SERIOUS COMORBIDITY PRESENT (MULTI): ICD-10-CM

## 2024-08-13 DIAGNOSIS — Z71.3 DIETARY COUNSELING: Primary | ICD-10-CM

## 2024-08-13 PROCEDURE — 97802 MEDICAL NUTRITION INDIV IN: CPT | Performed by: DIETITIAN, REGISTERED

## 2024-08-13 NOTE — PATIENT INSTRUCTIONS
- Please refer to your book entitled: Your Mediterranean Meal Plan, and follow Mediterranean Diet eating guidelines as reviewed.  - The Healthy Plate style of eating can be a helpful tool for incorporating healthy balanced meals in appropriate portions. (Healthy Plate: Start with a 9-inch diameter plate. Fill 1/2 the plate with non-starchy vegetables, 1/4 of the plate with whole grains or starchy vegetables, and 1/4 of the plate with a lean source of protein.   - Consider dinning out with restaurants that offer lean proteins and increased vegetables as discussed and aim for good source of each at each meal.  - Aim for 48 ounces of water daily and please consider reducing wine to no more than 1 glass in the evenings.  - Keep up the great work with weekly exercise.  - Follow-up as scheduled for the group classes.

## 2024-08-13 NOTE — PROGRESS NOTES
"Initial Nutrition Assessment    Patient was referred to nutrition by GENE Arriola  for weight management/desire to lose weight. Plans to start medication Wegovy soon by report. Other PMHX significant for HTN and Gout.      Diet recall reveals a consistent meal pattern with rare missed meals; however, reported intakes of larger portions and some calorically dense foods all likely contributing to lack of desired weight loss/contributing to weight gain over time. Fluids meeting low-end of recommendations in type and amount and pt would benefit to aim for at least 48 ounces of water daily for nutrition needs and hydration. Pt is incorporating consistent physical activity, meeting weekly recommendations. See all interventions/recommendations below as discussed during visit this day.      Patient reported symptoms: Difficulty losing weight    Anthropometrics:  Height:   Ht Readings from Last 1 Encounters:   08/13/24 1.753 m (5' 9\")      Weight:   Wt Readings from Last 10 Encounters:   08/13/24 134 kg (296 lb)   07/22/24 134 kg (296 lb)   06/05/23 123 kg (272 lb 1.6 oz)   10/17/22 122 kg (269 lb)   08/22/22 127 kg (279 lb)   08/09/22 126 kg (277 lb)   07/26/22 126 kg (277 lb)   06/07/21 115 kg (253 lb)   06/07/21 115 kg (253 lb)      Current BMI:   BMI Readings from Last 1 Encounters:   08/13/24 43.71 kg/m²        Labs:  Lab Results   Component Value Date    HGBA1C 5.6 07/22/2024      Lab Results   Component Value Date    CHOL 155 06/05/2023    CHOL 150 08/11/2022    CHOL 150 06/07/2021     Lab Results   Component Value Date    HDL 62.9 06/05/2023    HDL 57.8 08/11/2022    HDL 51.0 06/07/2021     No results found for: \"LDLCALC\"  Lab Results   Component Value Date    TRIG 60 06/05/2023    TRIG 103 08/11/2022    TRIG 109 06/07/2021       Malnutrition Screening:  Significant Unintentional weight loss: No  Eating less than 75% of usual intake for more than 2 weeks: No  Potential Signs of Inflammation: " No    Recommended Malnutrition Diagnosis: No malnutrition identified    Diet Recall-  Breakfast- omelet and toast and potatoes   Lunch- none if waking up later OR has a salad (various vegetables and cheese) OR salad and humus wrap   Dinner- stuffed egg plant with lamb OR Chinese cuisine with lower amounts of proteins and increased vegetables OR salads and stewed vegetables OR stuffed lamb OR fish and rice   Daily Snacks- none OR soup   Beverages- coffee with milk, water (24 ounces) and occasional hot tea either plain or with honey  Alcohol- 1-2 glasses of wine in the evening  Physical Activity- recently started the treadmill with walking 20-30 minutes at a time approximately 3 days weekly    Other pertinent patient reported Information:  - Recently started on a CPAP machine and has much more energy and started to increase activity as a result    Nutrition Diagnosis: Food and nutrition-related knowledge deficit related to lack of prior exposure to information as evidenced by BMI above normative standard for age and gender.    Readiness to Learn:  Cognitive ability: Alert and oriented  Motivation to learn: Interested  Family Support: Unable to assess- family not present  Instruction provided to: Patient  Patient learns best by: Multiple methods  Factors affecting learning: None   Physical limitations affecting learning: None    Education Materials Provided:   Your Mediterranean Meal Plan Booklet    Nutrition Interventions/Recommendations for 8/13/2024:  - Please refer to your book entitled: Your Mediterranean Meal Plan, and follow Mediterranean Diet eating guidelines as reviewed.  - The Healthy Plate style of eating can be a helpful tool for incorporating healthy balanced meals in appropriate portions. (Healthy Plate: Start with a 9-inch diameter plate. Fill 1/2 the plate with non-starchy vegetables, 1/4 of the plate with whole grains or starchy vegetables, and 1/4 of the plate with a lean source of protein.   -  Consider dinning out with restaurants that offer lean proteins and increased vegetables as discussed and aim for good source of each at each meal.  - Aim for 48 ounces of water daily and please consider reducing wine to no more than 1 glass in the evenings.  - Keep up the great work with weekly exercise.  - Follow-up as scheduled for the group classes.      Nutrition Monitoring & Evaluation: adherence to recommendations and patient stated goals    Need for follow-up:  [facility] group    Referred by: Renae Stanley, COOPER-CNP     MNT Billing Type: Medical Nutrition Assessment, each 15 min increment, for 3 increments.    SIGNATURE:   Helen Burton RD, CSOWJONATHAN, LD, CDCES                                                        DATE:   8/13/2024

## 2024-08-27 ENCOUNTER — APPOINTMENT (OUTPATIENT)
Dept: ENDOCRINOLOGY | Facility: CLINIC | Age: 69
End: 2024-08-27
Payer: MEDICARE

## 2024-09-03 ENCOUNTER — PATIENT MESSAGE (OUTPATIENT)
Dept: ENDOCRINOLOGY | Facility: CLINIC | Age: 69
End: 2024-09-03
Payer: MEDICARE

## 2024-09-03 ENCOUNTER — TELEPHONE (OUTPATIENT)
Dept: ENDOCRINOLOGY | Facility: CLINIC | Age: 69
End: 2024-09-03
Payer: MEDICARE

## 2024-09-03 DIAGNOSIS — E66.01 CLASS 3 SEVERE OBESITY WITHOUT SERIOUS COMORBIDITY WITH BODY MASS INDEX (BMI) OF 40.0 TO 44.9 IN ADULT, UNSPECIFIED OBESITY TYPE (MULTI): Primary | ICD-10-CM

## 2024-09-04 RX ORDER — SEMAGLUTIDE 0.25 MG/.5ML
0.25 INJECTION, SOLUTION SUBCUTANEOUS
Qty: 2 ML | Refills: 1 | Status: SHIPPED | OUTPATIENT
Start: 2024-09-04

## 2024-09-12 ENCOUNTER — APPOINTMENT (OUTPATIENT)
Dept: ENDOCRINOLOGY | Facility: CLINIC | Age: 69
End: 2024-09-12
Payer: MEDICARE

## 2024-09-12 VITALS — BODY MASS INDEX: 41.92 KG/M2 | WEIGHT: 283 LBS | HEIGHT: 69 IN

## 2024-09-12 DIAGNOSIS — E66.01 CLASS 3 SEVERE OBESITY DUE TO EXCESS CALORIES WITH SERIOUS COMORBIDITY AND BODY MASS INDEX (BMI) OF 40.0 TO 44.9 IN ADULT (MULTI): Primary | ICD-10-CM

## 2024-09-12 DIAGNOSIS — G47.33 OSA (OBSTRUCTIVE SLEEP APNEA): ICD-10-CM

## 2024-09-12 PROCEDURE — 99215 OFFICE O/P EST HI 40 MIN: CPT | Performed by: NURSE PRACTITIONER

## 2024-09-12 PROCEDURE — 3008F BODY MASS INDEX DOCD: CPT | Performed by: NURSE PRACTITIONER

## 2024-09-12 NOTE — PROGRESS NOTES
Jose Lawrence presents for weight loss management and obesity consult today.    Obesity History:  Childhood or teen obesity history: {BNDyesno:79937}  Age of Onset: {age at onset:[age]}  Lowest adult weight: ***  Highest adult weight: ***  Current weight: ***     Family history of obesity: {BNDyesno:99008}    Biggest challenge with weight management:     History of Weight Loss Efforts: {BNDyesno:92011}  Successful weight loss techniques attempted: {obesity treatment:85506}  Unsuccessful weight loss techniques attempted: {obesity treatment:57654}    Current typical daily diet:  Typical Breakfast: ***  Typical Lunch: ***  Typical Dinner: ***  Soda/latte weekly intake:  Take out/carry out/fast food weekly:  Portion sizes/seconds with meals: {bndportionsizes:72724}    Snacking  Daytime snacks: {BNDyesno:54285}  Evening snacks: {BNDyesno:31427}      Describe Appetite (always hungry/no hunger/average):  Are you full after a meal?  {BNDyesno:37129}  Emotional eater? {YES/NO:200010}  Boredom eater? {YES/NO:200010}    Current Exercise Habits {exercise types:03205}    Sleep patterns (insomnia, waking in night) /hours of sleep per night: ***  H/O Sleep apnea: {BNDyesno:20107}  Well rested? {YES/NO:200010}    Increased Stressors (describe daily stress): {BNDyesno:85808}      Any intake of an appetite suppressant or an anti-obesity medicine? {YES/NO:200010}    H/O Pancreatitis: {BNDyesno:39428}  H/O Thyroid Medullary Cancer: {BNDyesno:36922}    Past Medical History:   Diagnosis Date    Myalgia, other site 03/18/2019    Myofascial pain syndrome    Other allergy status, other than to drugs and biological substances     History of environmental allergies    Personal history of other diseases of the circulatory system     History of hypertension    Personal history of other diseases of the musculoskeletal system and connective tissue     History of gout    Personal history of other diseases of the musculoskeletal system and connective  tissue 03/18/2019    History of low back pain    Personal history of other diseases of the nervous system and sense organs     History of cataract    Personal history of other diseases of the respiratory system     History of asthma    Personal history of other endocrine, nutritional and metabolic disease     History of high cholesterol    Personal history of other specified conditions     History of snoring    Personal history of other specified conditions     History of ulceration       Current Outpatient Medications   Medication Sig Dispense Refill    allopurinol (Zyloprim) 300 mg tablet Take 1 tablet (300 mg) by mouth once daily. 30 tablet 2    amLODIPine (Norvasc) 10 mg tablet TAKE 1 TABLET BY MOUTH EVERY DAY 90 tablet 1    atorvastatin (Lipitor) 40 mg tablet Take 1 tablet (40 mg) by mouth once daily. 30 tablet 2    ketoconazole (NIZOral) 2 % cream APPLY 1 APPLICATION TWICE A DAY TO AFFECTED AREA OF FACE      lisinopril 5 mg tablet Take 1 tablet (5 mg) by mouth once daily.      metoprolol succinate XL (Toprol-XL) 100 mg 24 hr tablet Take 1 tablet (100 mg) by mouth once daily. Do not crush or chew. 30 tablet 2    minoxidil (Loniten) 10 mg tablet TAKE 1 TABLET BY MOUTH EVERY DAY 90 tablet 1    semaglutide, weight loss, (Wegovy) 0.25 mg/0.5 mL pen injector Inject 0.25 mg under the skin every 7 days. 2 mL 1    sennosides (Senokot) 8.6 mg tablet Take 1 tablet (8.6 mg) by mouth once daily as needed.      tadalafil (Cialis) 5 mg tablet Take 1 tablet (5 mg) by mouth once daily as needed for erectile dysfunction. 10 tablet 2    triamcinolone (Kenalog) 0.1 % cream Apply twice daily to affected areas of body (avoid face, groin, body folds) for 2 weeks, then taper to twice daily on weekends only; repeat every 6-8 weeks as needed for flares (Patient not taking: Reported on 7/22/2024) 80 g 1    triamterene-hydrochlorothiazid (Maxzide) 75-50 mg tablet Take 1 tablet by mouth once daily. 30 tablet 2     No current  "facility-administered medications for this visit.           Review of Systems  Review of Systems        Objective   There were no vitals taken for this visit.    Physical Exam  Physical Exam    Lab Review  Lab Results   Component Value Date    HGBA1C 5.6 07/22/2024     No results found for: \"LDLCALC\"    Weight Trends  Trends of weight reviewed in visit, see graph below:  Wt Readings from Last 3 Encounters:   08/13/24 134 kg (296 lb)   07/22/24 134 kg (296 lb)   06/05/23 123 kg (272 lb 1.6 oz)   (  Assessment/Plan     Follow up and Goals:  ....      Problem List Items Addressed This Visit    None      Diet interventions: {obes diet:46911}  Discussed Mediterranean Diet, given pamphlet or it will be mailed, we looked at ADA plate, portion sizes, recipes. Advised to review in preparation for nutrition consult    Handouts given: {none:18549}    Exercise intervention:   We discussed the importance of incorporating resistance and free weight  lifting into physical activity routine to prevent muscle wasting with weight loss, enhance bone health, the positive role increased muscle has in burning fat at rest. We looked at examples of these types of exercise routines online. Advised to do modifications. Advised to check with PCP if there is a h/o musculoskeletal injury or hx. We discussed benefits of walking with weight loss and as a cardio form of exercise. Gradually increase exercise to a goal of 150 minutes at least per week.    Informal measures, e.g. taking stairs instead of elevator: {yes/no:63}  Formal exercise regimen: {yes***/no:95807::\"no\"}      Follow Up:  Referred to nutrition or continue to work with current dietitian   Discussed obesity medications, side effects and mechanism of action reviewed with patient  Discussed physical activity: we reviewed [employer] videos for strength training, advised to use modifications for these videos, we discussed benefits of strength training and resistance bands  on bone and " muscle health, we discussed walking and water aerobic options for cardio  Discussed Mediterranean Diet, given pamphlet or it will be mailed, we looked at ADA plate, portion sizes, recipes. Advised to review Mediterranean Meal Plan booklet  in preparation for nutrition consult  ....  1 month group visit and nutrition visit in person or  virtual  Please reach out if you need anything or have further questions

## 2024-09-12 NOTE — PROGRESS NOTES
"Subjective  Jose Lawrence is a 69 y.o. male with a hx of  obesity who presents for weight management and obesity medicine follow up.    Current Plan  1. Nutrition: \"I have cut my breakfast in half in potions and amount\", eats hald and save the half    2. Sleep: he has started sleep therapy, uses CPAP but having some issues with mask fit, when it leaks he is awake, when it does not leak a very good sleep and feels increasingly alert     3. Stress: stable     4. Exercise: In process     5. Appetite control: decreased portions, no change in the appeite but more mindful    Obesity medication currently taking: none    6. Prior Goals (met):   Nutrition-consult, Mediterranean approach, portions- Met  Physical- walk 15 minutes after dinner, free weight 2-3 times per week- In process  Medication- He did not receive Wegovy yet, has lost pounds without it    Weight trend:    Wt Readings from Last 3 Encounters:   09/12/24 128 kg (283 lb)   08/13/24 134 kg (296 lb)   07/22/24 134 kg (296 lb)   (    Recent weight:      Current Outpatient Medications:     allopurinol (Zyloprim) 300 mg tablet, Take 1 tablet (300 mg) by mouth once daily., Disp: 30 tablet, Rfl: 2    amLODIPine (Norvasc) 10 mg tablet, TAKE 1 TABLET BY MOUTH EVERY DAY, Disp: 90 tablet, Rfl: 1    atorvastatin (Lipitor) 40 mg tablet, Take 1 tablet (40 mg) by mouth once daily., Disp: 30 tablet, Rfl: 2    ketoconazole (NIZOral) 2 % cream, APPLY 1 APPLICATION TWICE A DAY TO AFFECTED AREA OF FACE, Disp: , Rfl:     lisinopril 5 mg tablet, Take 1 tablet (5 mg) by mouth once daily., Disp: , Rfl:     metoprolol succinate XL (Toprol-XL) 100 mg 24 hr tablet, Take 1 tablet (100 mg) by mouth once daily. Do not crush or chew., Disp: 30 tablet, Rfl: 2    minoxidil (Loniten) 10 mg tablet, TAKE 1 TABLET BY MOUTH EVERY DAY, Disp: 90 tablet, Rfl: 1    semaglutide, weight loss, (Wegovy) 0.25 mg/0.5 mL pen injector, Inject 0.25 mg under the skin every 7 days., Disp: 2 mL, Rfl: 1    " sennosides (Senokot) 8.6 mg tablet, Take 1 tablet (8.6 mg) by mouth once daily as needed., Disp: , Rfl:     tadalafil (Cialis) 5 mg tablet, Take 1 tablet (5 mg) by mouth once daily as needed for erectile dysfunction., Disp: 10 tablet, Rfl: 2    triamcinolone (Kenalog) 0.1 % cream, Apply twice daily to affected areas of body (avoid face, groin, body folds) for 2 weeks, then taper to twice daily on weekends only; repeat every 6-8 weeks as needed for flares (Patient not taking: Reported on 7/22/2024), Disp: 80 g, Rfl: 1    triamterene-hydrochlorothiazid (Maxzide) 75-50 mg tablet, Take 1 tablet by mouth once daily., Disp: 30 tablet, Rfl: 2    ROS:  System: normal  Eyes : no visual changes  Neck : no tenderness, no new lumps/bumps  Respiratory : no SOB  Cardiovascular : no chest pain, no palpitations  Gastro-Intestinal : no abdominal concerns  Neurological : no numbness or tingling in the extremities  Musculoskeletal : no joint paint, no muscle pain  Skin : no unusual rashes  Psychiatric : no depression, no anxiety  See HPI for Endocrine ROS    Past Medical History:   Diagnosis Date    Myalgia, other site 03/18/2019    Myofascial pain syndrome    Other allergy status, other than to drugs and biological substances     History of environmental allergies    Personal history of other diseases of the circulatory system     History of hypertension    Personal history of other diseases of the musculoskeletal system and connective tissue     History of gout    Personal history of other diseases of the musculoskeletal system and connective tissue 03/18/2019    History of low back pain    Personal history of other diseases of the nervous system and sense organs     History of cataract    Personal history of other diseases of the respiratory system     History of asthma    Personal history of other endocrine, nutritional and metabolic disease     History of high cholesterol    Personal history of other specified conditions      History of snoring    Personal history of other specified conditions     History of ulceration       Past Surgical History:   Procedure Laterality Date    OTHER SURGICAL HISTORY  12/28/2017    Vagotomy With Pyloroplasty       Social History     Socioeconomic History    Marital status:      Spouse name: Not on file    Number of children: Not on file    Years of education: Not on file    Highest education level: Not on file   Occupational History    Not on file   Tobacco Use    Smoking status: Never    Smokeless tobacco: Never   Substance and Sexual Activity    Alcohol use: Yes    Drug use: Never    Sexual activity: Not on file   Other Topics Concern    Not on file   Social History Narrative    Not on file     Social Determinants of Health     Financial Resource Strain: Low Risk  (10/30/2023)    Received from [facility]    Overall Financial Resource Strain (CARDIA)     Difficulty of Paying Living Expenses: Not very hard   Food Insecurity: No Food Insecurity (10/30/2023)    Received from [facility]    Hunger Vital Sign     Worried About Running Out of Food in the Last Year: Never true     Ran Out of Food in the Last Year: Never true   Transportation Needs: No Transportation Needs (10/30/2023)    Received from [facility]    PRAPARE - Transportation     Lack of Transportation (Medical): No     Lack of Transportation (Non-Medical): No   Physical Activity: Inactive (10/30/2023)    Received from [facility]    Exercise Vital Sign     Days of Exercise per Week: 0 days     Minutes of Exercise per Session: 20 min   Stress: No Stress Concern Present (10/30/2023)    Received from [facility]    [place] [employer] - Occupational Stress Questionnaire     Feeling of Stress : Not at all   Social Connections: Moderately Isolated (10/30/2023)    Received from [facility]    Social Connection and Isolation Panel [NHANES]      "Frequency of Communication with Friends and Family: Twice a week     Frequency of Social Gatherings with Friends and Family: Twice a week     Attends [employer] Services: Never     Active Member of Clubs or Organizations: Yes     Attends Club or Organization Meetings: More than 4 times per year     Marital Status:    Intimate Partner Violence: Not At Risk (10/30/2023)    Received from [facility]    Humiliation, Afraid, Rape, and Kick questionnaire     Fear of Current or Ex-Partner: No     Emotionally Abused: No     Physically Abused: No     Sexually Abused: No   Housing Stability: Low Risk  (10/30/2023)    Received from [facility]    Housing Stability Vital Sign     Unable to Pay for Housing in the Last Year: No     Number of Places Lived in the Last Year: 1     Unstable Housing in the Last Year: No       Objective    Physical Exam:  Height 1.753 m (5' 9\"), weight 128 kg (283 lb).  General : alert and oriented X3, no acute distress  Eyes : EOMI     Assessment/Plan   Joes Lawrence is a 69 y.o. male with a hx of obesity that presents for follow up weight management and obesity medicine visie.    A/P Follow up:  ...  New Goals:  Medications: Wegovy initiation  Nutrition: continue to decrease the  portions sizes. Sitck to kcal discussed with Helen  Physical Exercise: Initiate exercise routine M-F, walking daily, strength 2 times per week  Other: (Sleep, Stress, Mental Health)  5. Follow up: 1 month    SMG Topic:    In the group visit we discussed the concept of weight plateau. We discussed common causes which often involve decreased mindfulness and attention with food patterns we have become accustomed to and the concept of decreased focus of these or \"falling off the wagon\" with these goals leading to stunting our weight loss. We discussed the concept that we must continue to decrease our calorie intake and adjust it as our weight decreases to continue to decrease weight. Useful " interventions such as food logging, mindful eating, re-centering regarding our portions and attention to ensuring we are eating protein and fiber rich foods were discussed. We also discussed examining our physical activity which is most commonly the issue with plateau of weight. We discussed how this may easily occur when we get out of habit or routine with physical activity often related to sedentary work lifestyle or falling out of physical exercise routines. We also discussed how our bodies become accustomed to our workouts and the need to change workout routines after 3-4 weeks to continue to make progress weight loss and physical strength. We also discussed importance of adding strength and resistance workouts into the physical fitness routine to build muscle and the positive impact that this has with weight loss for weight loss at rest, weight loss in sleep hours, and loss of fat. We discussed goals of 150 minutes of moderate activity being an initial goal and working our way up to 300 active minutes or more over time.    Weight Management : Reviewed the principles of energy metabolism, caloric intake and expenditure, and rationale for a treatment program.  Also reinforced need for reduced calorie, low fat diet and increased physical activity.        Follow up in a group or individual visit monthly.

## 2024-09-13 NOTE — PATIENT INSTRUCTIONS
New Goals:  Medications: Wegovy initiation  Nutrition: continue to decrease the  portions sizes. Sitck to kcal discussed with Helen  Physical Exercise: Initiate exercise routine M-F, walking daily, strength 2 times per week  Other: (Sleep, Stress, Mental Health)  5. Follow up: 1 month

## 2024-11-08 DIAGNOSIS — E66.01 CLASS 3 SEVERE OBESITY DUE TO EXCESS CALORIES WITH SERIOUS COMORBIDITY AND BODY MASS INDEX (BMI) OF 40.0 TO 44.9 IN ADULT: ICD-10-CM

## 2024-11-08 DIAGNOSIS — E66.813 CLASS 3 SEVERE OBESITY DUE TO EXCESS CALORIES WITH SERIOUS COMORBIDITY AND BODY MASS INDEX (BMI) OF 40.0 TO 44.9 IN ADULT: ICD-10-CM

## 2024-11-18 ENCOUNTER — APPOINTMENT (OUTPATIENT)
Dept: ENDOCRINOLOGY | Facility: CLINIC | Age: 69
End: 2024-11-18
Payer: MEDICARE

## 2025-01-14 ENCOUNTER — APPOINTMENT (OUTPATIENT)
Dept: ENDOCRINOLOGY | Facility: CLINIC | Age: 70
End: 2025-01-14
Payer: MEDICARE

## 2025-02-06 DIAGNOSIS — E66.01 CLASS 3 SEVERE OBESITY DUE TO EXCESS CALORIES WITH SERIOUS COMORBIDITY AND BODY MASS INDEX (BMI) OF 40.0 TO 44.9 IN ADULT: ICD-10-CM

## 2025-02-06 DIAGNOSIS — E66.813 CLASS 3 SEVERE OBESITY DUE TO EXCESS CALORIES WITH SERIOUS COMORBIDITY AND BODY MASS INDEX (BMI) OF 40.0 TO 44.9 IN ADULT: ICD-10-CM

## 2025-03-03 ENCOUNTER — APPOINTMENT (OUTPATIENT)
Dept: ENDOCRINOLOGY | Facility: CLINIC | Age: 70
End: 2025-03-03
Payer: MEDICARE

## 2025-05-02 ENCOUNTER — APPOINTMENT (OUTPATIENT)
Dept: OPHTHALMOLOGY | Facility: CLINIC | Age: 70
End: 2025-05-02
Payer: MEDICARE

## 2025-05-02 DIAGNOSIS — E11.9 CONTROLLED TYPE 2 DIABETES MELLITUS WITHOUT COMPLICATION, UNSPECIFIED WHETHER LONG TERM INSULIN USE: ICD-10-CM

## 2025-05-02 DIAGNOSIS — Z96.1 PSEUDOPHAKIA, RIGHT EYE: Primary | ICD-10-CM

## 2025-05-02 DIAGNOSIS — H25.812 COMBINED FORM OF AGE-RELATED CATARACT, LEFT EYE: ICD-10-CM

## 2025-05-02 PROCEDURE — 99213 OFFICE O/P EST LOW 20 MIN: CPT | Performed by: OPHTHALMOLOGY

## 2025-05-02 ASSESSMENT — REFRACTION_MANIFEST
OD_AXIS: 105
OD_CYLINDER: -1.00
OS_SPHERE: -0.75
OS_AXIS: 040
OS_CYLINDER: -0.50
OS_AXIS: 045
OS_SPHERE: -0.75
OD_ADD: +2.75
OS_ADD: +2.75
OD_CYLINDER: -1.00
OS_CYLINDER: -0.75
OD_SPHERE: +0.25
OD_SPHERE: +0.50
OD_AXIS: 105
METHOD_AUTOREFRACTION: 1

## 2025-05-02 ASSESSMENT — ENCOUNTER SYMPTOMS
ALLERGIC/IMMUNOLOGIC NEGATIVE: 0
NEUROLOGICAL NEGATIVE: 0
HEMATOLOGIC/LYMPHATIC NEGATIVE: 0
CARDIOVASCULAR NEGATIVE: 0
GASTROINTESTINAL NEGATIVE: 0
RESPIRATORY NEGATIVE: 0
ENDOCRINE NEGATIVE: 0
CONSTITUTIONAL NEGATIVE: 0
EYES NEGATIVE: 1
PSYCHIATRIC NEGATIVE: 0
MUSCULOSKELETAL NEGATIVE: 0

## 2025-05-02 ASSESSMENT — EXTERNAL EXAM - RIGHT EYE: OD_EXAM: NORMAL

## 2025-05-02 ASSESSMENT — TONOMETRY
OD_IOP_MMHG: 16
IOP_METHOD: TONOPEN
OS_IOP_MMHG: 15

## 2025-05-02 ASSESSMENT — VISUAL ACUITY
OD_PH_CC: 20/25
METHOD: SNELLEN - LINEAR
CORRECTION_TYPE: GLASSES
OD_CC: 20/40
OS_CC: 20/30

## 2025-05-02 ASSESSMENT — CONF VISUAL FIELD
OS_SUPERIOR_TEMPORAL_RESTRICTION: 0
OS_INFERIOR_TEMPORAL_RESTRICTION: 0
OD_SUPERIOR_TEMPORAL_RESTRICTION: 0
OS_SUPERIOR_NASAL_RESTRICTION: 0
OD_INFERIOR_NASAL_RESTRICTION: 0
OD_SUPERIOR_NASAL_RESTRICTION: 0
OD_INFERIOR_TEMPORAL_RESTRICTION: 0
OD_NORMAL: 1
OS_INFERIOR_NASAL_RESTRICTION: 0
OS_NORMAL: 1
METHOD: COUNTING FINGERS

## 2025-05-02 ASSESSMENT — SLIT LAMP EXAM - LIDS
COMMENTS: FLOPPY EYELIDS
COMMENTS: FLOPPY EYELIDS

## 2025-05-02 ASSESSMENT — CUP TO DISC RATIO
OS_RATIO: .3
OD_RATIO: .3

## 2025-05-02 ASSESSMENT — EXTERNAL EXAM - LEFT EYE: OS_EXAM: NORMAL

## 2025-05-02 NOTE — PROGRESS NOTES
Assessment/Plan   Diagnoses and all orders for this visit:  Pseudophakia, right eye  Controlled type 2 diabetes mellitus without complication, unspecified whether long term insulin use  no retinopathy observed on exam today od/os, pt ed to continue good BGlc, blood pressure and lipid control, rtc with any changes in vision, otherwise monitor 1 year   Combined form of age-related cataract, left eye     Visually significant cataract OS.     Indication for cataract surgery: Input To potentially improve visual acuity and improve quality of life/reduce symptoms.   Based on a comprehensive eye exam performed today, a visually significant cataract appears to be the source of decreased vision, diminished quality of life, and impairment of activities of daily living.   Discussed option of cataract surgery vs observation. Patient  wishes to have cataract surgery at this time.   Discussed surgical procedure with patient.   As a result of cataract extraction, it is believed that the patient will experience improved vision. Discussed potential risks, benefits, and complications of cataract surgery including but not limited to pain, bleeding, infection, inflammation, edema, increased eye pressure, retinal tear/detachment, lens dislocation, ptosis, iris damage, need for additional surgery, need for glasses after surgery, loss of vision/loss of eye. Patient understands and wishes to proceed. All questions were answered.   Will schedule cataract surgery OS.   Discussed IOL options (standard monofocal, monofocal with monovision, toric, multifocal).   Lens chosen: standard monofocal.   Defer/decline toric/multifocal lens at this time.   Dominance:   Special considerations: pt is in a wheelchair that can fully recline, if no salina lift available can do in his chair, this is what they did for other eye  Best contact number: [phone]  Drops:   -Ketorolac and Ofloxacin 4x/day starting 1 day prior to surgery; Prednisolone acetate 1%  4x/day starting after surgery   Discussed that may potentially need glasses for best vision both at distance and at near.    I personally reviewed the lenstar measurements and will choose the lens accordingly.

## 2025-05-23 ENCOUNTER — APPOINTMENT (OUTPATIENT)
Dept: OPHTHALMOLOGY | Facility: CLINIC | Age: 70
End: 2025-05-23
Payer: MEDICARE

## 2025-05-23 DIAGNOSIS — Z98.890 S/P YAG CAPSULOTOMY, LEFT: Primary | ICD-10-CM

## 2025-05-23 ASSESSMENT — CONF VISUAL FIELD
OD_INFERIOR_TEMPORAL_RESTRICTION: 0
OS_SUPERIOR_NASAL_RESTRICTION: 0
OS_NORMAL: 1
OD_INFERIOR_NASAL_RESTRICTION: 0
OS_SUPERIOR_TEMPORAL_RESTRICTION: 0
OD_NORMAL: 1
OS_INFERIOR_TEMPORAL_RESTRICTION: 0
OS_INFERIOR_NASAL_RESTRICTION: 0
OD_SUPERIOR_NASAL_RESTRICTION: 0
OD_SUPERIOR_TEMPORAL_RESTRICTION: 0

## 2025-05-23 ASSESSMENT — TONOMETRY
IOP_METHOD: GOLDMANN APPLANATION
OS_IOP_MMHG: 14
OD_IOP_MMHG: 14

## 2025-05-23 ASSESSMENT — REFRACTION_MANIFEST
OD_ADD: +2.75
OS_ADD: +2.75
OD_SPHERE: +0.25
OD_AXIS: 105
OD_CYLINDER: -1.00
OS_CYLINDER: -0.75
OS_SPHERE: -0.50
OS_AXIS: 045

## 2025-05-23 ASSESSMENT — CUP TO DISC RATIO
OS_RATIO: .3
OD_RATIO: .3

## 2025-05-23 ASSESSMENT — REFRACTION_WEARINGRX
OS_CYLINDER: -0.75
OD_SPHERE: +0.25
OD_CYLINDER: -1.00
OS_ADD: +2.75
OD_AXIS: 105
OD_ADD: +2.75
OS_AXIS: 045
OS_SPHERE: -0.50

## 2025-05-23 ASSESSMENT — SLIT LAMP EXAM - LIDS
COMMENTS: FLOPPY EYELIDS
COMMENTS: FLOPPY EYELIDS

## 2025-05-23 ASSESSMENT — EXTERNAL EXAM - LEFT EYE: OS_EXAM: NORMAL

## 2025-05-23 ASSESSMENT — VISUAL ACUITY
CORRECTION_TYPE: GLASSES
METHOD: SNELLEN - LINEAR
OD_CC: 20/20
OS_CC: 20/25-2

## 2025-05-23 ASSESSMENT — EXTERNAL EXAM - RIGHT EYE: OD_EXAM: NORMAL

## 2025-05-23 NOTE — PROGRESS NOTES
Assessment/Plan   Diagnoses and all orders for this visit:  S/P YAG capsulotomy, left  Vision good  Glasses prescription given to patient today   -Followup in 1 year or as needed for symptoms (RSVP: redness, sensitivity to light, vision loss, pain)

## 2025-09-02 ENCOUNTER — APPOINTMENT (OUTPATIENT)
Dept: OPHTHALMOLOGY | Facility: CLINIC | Age: 70
End: 2025-09-02
Payer: MEDICARE

## 2025-09-05 ENCOUNTER — APPOINTMENT (OUTPATIENT)
Dept: OPHTHALMOLOGY | Facility: CLINIC | Age: 70
End: 2025-09-05
Payer: MEDICARE

## 2026-05-27 ENCOUNTER — APPOINTMENT (OUTPATIENT)
Dept: OPHTHALMOLOGY | Facility: CLINIC | Age: 71
End: 2026-05-27
Payer: MEDICARE